# Patient Record
Sex: MALE | Race: BLACK OR AFRICAN AMERICAN | NOT HISPANIC OR LATINO
[De-identification: names, ages, dates, MRNs, and addresses within clinical notes are randomized per-mention and may not be internally consistent; named-entity substitution may affect disease eponyms.]

---

## 2017-06-04 ENCOUNTER — RX RENEWAL (OUTPATIENT)
Age: 47
End: 2017-06-04

## 2017-06-20 ENCOUNTER — APPOINTMENT (OUTPATIENT)
Dept: UROLOGY | Facility: CLINIC | Age: 47
End: 2017-06-20

## 2017-07-01 ENCOUNTER — EMERGENCY (EMERGENCY)
Facility: HOSPITAL | Age: 47
LOS: 0 days | Discharge: HOME | End: 2017-07-01
Attending: EMERGENCY MEDICINE
Payer: MEDICAID

## 2017-07-01 VITALS
HEART RATE: 76 BPM | HEIGHT: 77 IN | SYSTOLIC BLOOD PRESSURE: 128 MMHG | OXYGEN SATURATION: 98 % | WEIGHT: 240.08 LBS | TEMPERATURE: 98 F | DIASTOLIC BLOOD PRESSURE: 78 MMHG | RESPIRATION RATE: 18 BRPM

## 2017-07-01 DIAGNOSIS — M25.569 PAIN IN UNSPECIFIED KNEE: ICD-10-CM

## 2017-07-01 DIAGNOSIS — Z91.010 ALLERGY TO PEANUTS: ICD-10-CM

## 2017-07-01 DIAGNOSIS — I10 ESSENTIAL (PRIMARY) HYPERTENSION: ICD-10-CM

## 2017-07-01 DIAGNOSIS — M17.0 BILATERAL PRIMARY OSTEOARTHRITIS OF KNEE: ICD-10-CM

## 2017-07-01 PROCEDURE — 73562 X-RAY EXAM OF KNEE 3: CPT | Mod: 26,50

## 2017-07-01 PROCEDURE — 99283 EMERGENCY DEPT VISIT LOW MDM: CPT

## 2017-07-01 RX ORDER — BACLOFEN 100 %
1 POWDER (GRAM) MISCELLANEOUS
Qty: 20 | Refills: 0 | OUTPATIENT
Start: 2017-07-01 | End: 2017-07-11

## 2017-07-01 NOTE — ED ADULT NURSE NOTE - OBJECTIVE STATEMENT
Patient stated that he has been having bilateral knees pain for about awhile, right knee pain worse than the left, pain of 8 on a scale of 0 to 10.  Swelling to right knee noted, patient has history of knee pain, denies injury, denies fall, denies recent trauma

## 2017-08-03 ENCOUNTER — APPOINTMENT (OUTPATIENT)
Dept: UROLOGY | Facility: CLINIC | Age: 47
End: 2017-08-03
Payer: MEDICAID

## 2017-08-03 VITALS — BODY MASS INDEX: 29.41 KG/M2 | WEIGHT: 248 LBS

## 2017-08-03 PROCEDURE — 99214 OFFICE O/P EST MOD 30 MIN: CPT

## 2017-09-09 NOTE — ED ADULT NURSE NOTE - MODE OF DISCHARGE
No bruits; no thyromegaly or nodules No bruits; no thyromegaly or nodules No bruits; no thyromegaly or nodules No bruits; no thyromegaly or nodules No bruits; no thyromegaly or nodules No bruits; no thyromegaly or nodules No bruits; no thyromegaly or nodules No bruits; no thyromegaly or nodules Ambulatory

## 2017-09-12 ENCOUNTER — APPOINTMENT (OUTPATIENT)
Dept: UROLOGY | Facility: CLINIC | Age: 47
End: 2017-09-12

## 2017-10-16 ENCOUNTER — EMERGENCY (EMERGENCY)
Facility: HOSPITAL | Age: 47
LOS: 0 days | Discharge: HOME | End: 2017-10-16
Attending: EMERGENCY MEDICINE
Payer: MEDICAID

## 2017-10-16 VITALS
OXYGEN SATURATION: 96 % | SYSTOLIC BLOOD PRESSURE: 156 MMHG | HEIGHT: 77 IN | WEIGHT: 244.05 LBS | RESPIRATION RATE: 20 BRPM | TEMPERATURE: 99 F | DIASTOLIC BLOOD PRESSURE: 103 MMHG | HEART RATE: 63 BPM

## 2017-10-16 DIAGNOSIS — I10 ESSENTIAL (PRIMARY) HYPERTENSION: ICD-10-CM

## 2017-10-16 DIAGNOSIS — Z79.82 LONG TERM (CURRENT) USE OF ASPIRIN: ICD-10-CM

## 2017-10-16 DIAGNOSIS — Z91.010 ALLERGY TO PEANUTS: ICD-10-CM

## 2017-10-16 DIAGNOSIS — R05 COUGH: ICD-10-CM

## 2017-10-16 PROCEDURE — 99284 EMERGENCY DEPT VISIT MOD MDM: CPT | Mod: 25

## 2017-10-16 PROCEDURE — 71020: CPT | Mod: 26

## 2017-10-16 NOTE — ED ADULT NURSE NOTE - OBJECTIVE STATEMENT
sleep apnea, htn, dm 2, htn, allergic rhinitis, gerd, dm neuropathy  dx'd w/ acute bronchitis last week, took Azithromycin and completed course reports productive cough, white phlegm and night sweats x 3 wks

## 2017-10-16 NOTE — ED PROVIDER NOTE - MEDICAL DECISION MAKING DETAILS
cough, otherwise clear lungs to dc with hycodan for cough otherwise already was on azithromycin to dc with follow up with PMD.

## 2017-10-16 NOTE — ED PROVIDER NOTE - OBJECTIVE STATEMENT
47 year old male with PMH HTN presenting due to cough x 3 weeks, had been on azithromycin last week otherwise states no fever/chills but had been having some sweating episodes. No SOB no wheezing at this time, cough is dry and otherwise cough medications not working. on benzonatate

## 2017-12-18 ENCOUNTER — EMERGENCY (EMERGENCY)
Facility: HOSPITAL | Age: 47
LOS: 0 days | Discharge: HOME | End: 2017-12-18
Attending: EMERGENCY MEDICINE
Payer: MEDICAID

## 2017-12-18 VITALS
HEART RATE: 74 BPM | RESPIRATION RATE: 17 BRPM | SYSTOLIC BLOOD PRESSURE: 145 MMHG | WEIGHT: 271.17 LBS | HEIGHT: 77 IN | TEMPERATURE: 98 F | DIASTOLIC BLOOD PRESSURE: 88 MMHG | OXYGEN SATURATION: 98 %

## 2017-12-18 PROCEDURE — 73562 X-RAY EXAM OF KNEE 3: CPT | Mod: 26,RT

## 2017-12-18 PROCEDURE — 99284 EMERGENCY DEPT VISIT MOD MDM: CPT

## 2017-12-18 RX ORDER — KETOROLAC TROMETHAMINE 30 MG/ML
30 SYRINGE (ML) INJECTION ONCE
Qty: 0 | Refills: 0 | Status: DISCONTINUED | OUTPATIENT
Start: 2017-12-18 | End: 2017-12-18

## 2017-12-18 RX ADMIN — Medication 30 MILLIGRAM(S): at 11:52

## 2017-12-18 NOTE — ED PROVIDER NOTE - MUSCULOSKELETAL, MLM
Spine appears normal, range of motion is not limited, no muscle or joint tenderness Spine appears normal, range of motion is not limited, left medial knee mild tenderness, + mild knee swelling

## 2017-12-18 NOTE — ED ADULT NURSE NOTE - OBJECTIVE STATEMENT
received pt to ft alert and oriented times 4. complaining of right knee pain. states " I think I pulled something while I was cleaning." pt states he has history of knee effusion and arhtritis in that knee
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
## 2017-12-19 DIAGNOSIS — I10 ESSENTIAL (PRIMARY) HYPERTENSION: ICD-10-CM

## 2017-12-19 DIAGNOSIS — M25.561 PAIN IN RIGHT KNEE: ICD-10-CM

## 2017-12-19 DIAGNOSIS — S83.411A SPRAIN OF MEDIAL COLLATERAL LIGAMENT OF RIGHT KNEE, INITIAL ENCOUNTER: ICD-10-CM

## 2017-12-19 DIAGNOSIS — M13.861 OTHER SPECIFIED ARTHRITIS, RIGHT KNEE: ICD-10-CM

## 2017-12-19 DIAGNOSIS — Z91.010 ALLERGY TO PEANUTS: ICD-10-CM

## 2018-01-30 ENCOUNTER — EMERGENCY (EMERGENCY)
Facility: HOSPITAL | Age: 48
LOS: 0 days | Discharge: HOME | End: 2018-01-30
Attending: EMERGENCY MEDICINE
Payer: MEDICAID

## 2018-01-30 VITALS
DIASTOLIC BLOOD PRESSURE: 119 MMHG | SYSTOLIC BLOOD PRESSURE: 172 MMHG | HEART RATE: 81 BPM | WEIGHT: 270.07 LBS | RESPIRATION RATE: 17 BRPM | HEIGHT: 76 IN | OXYGEN SATURATION: 100 % | TEMPERATURE: 98 F

## 2018-01-30 VITALS
RESPIRATION RATE: 18 BRPM | DIASTOLIC BLOOD PRESSURE: 102 MMHG | SYSTOLIC BLOOD PRESSURE: 145 MMHG | HEART RATE: 75 BPM | OXYGEN SATURATION: 100 %

## 2018-01-30 PROCEDURE — 99284 EMERGENCY DEPT VISIT MOD MDM: CPT

## 2018-01-30 PROCEDURE — 72100 X-RAY EXAM L-S SPINE 2/3 VWS: CPT | Mod: 26

## 2018-01-30 RX ORDER — KETOROLAC TROMETHAMINE 30 MG/ML
30 SYRINGE (ML) INJECTION ONCE
Qty: 0 | Refills: 0 | Status: DISCONTINUED | OUTPATIENT
Start: 2018-01-30 | End: 2018-01-30

## 2018-01-30 RX ADMIN — Medication 30 MILLIGRAM(S): at 21:55

## 2018-01-30 RX ADMIN — Medication 30 MILLIGRAM(S): at 22:06

## 2018-01-30 NOTE — ED PROVIDER NOTE - MUSCULOSKELETAL, MLM
Spine appears normal, range of motion is not limited, + mild bilateral mid to distal paravertebral lumbar tenderness
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
## 2018-01-31 DIAGNOSIS — M54.5 LOW BACK PAIN: ICD-10-CM

## 2018-01-31 DIAGNOSIS — M17.9 OSTEOARTHRITIS OF KNEE, UNSPECIFIED: ICD-10-CM

## 2018-01-31 DIAGNOSIS — Z79.82 LONG TERM (CURRENT) USE OF ASPIRIN: ICD-10-CM
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
## 2018-01-31 DIAGNOSIS — S39.012A STRAIN OF MUSCLE, FASCIA AND TENDON OF LOWER BACK, INITIAL ENCOUNTER: ICD-10-CM

## 2018-01-31 DIAGNOSIS — I10 ESSENTIAL (PRIMARY) HYPERTENSION: ICD-10-CM
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
## 2018-01-31 DIAGNOSIS — Z91.010 ALLERGY TO PEANUTS: ICD-10-CM

## 2018-01-31 DIAGNOSIS — M16.11 UNILATERAL PRIMARY OSTEOARTHRITIS, RIGHT HIP: ICD-10-CM

## 2018-03-18 ENCOUNTER — EMERGENCY (EMERGENCY)
Facility: HOSPITAL | Age: 48
LOS: 0 days | Discharge: HOME | End: 2018-03-18
Attending: EMERGENCY MEDICINE
Payer: MEDICARE

## 2018-03-18 VITALS
DIASTOLIC BLOOD PRESSURE: 69 MMHG | OXYGEN SATURATION: 99 % | TEMPERATURE: 98 F | RESPIRATION RATE: 19 BRPM | SYSTOLIC BLOOD PRESSURE: 100 MMHG | HEART RATE: 60 BPM

## 2018-03-18 VITALS
DIASTOLIC BLOOD PRESSURE: 65 MMHG | HEIGHT: 77 IN | TEMPERATURE: 98 F | WEIGHT: 259.93 LBS | OXYGEN SATURATION: 97 % | HEART RATE: 74 BPM | SYSTOLIC BLOOD PRESSURE: 113 MMHG | RESPIRATION RATE: 16 BRPM

## 2018-03-18 DIAGNOSIS — K59.00 CONSTIPATION, UNSPECIFIED: ICD-10-CM

## 2018-03-18 DIAGNOSIS — I10 ESSENTIAL (PRIMARY) HYPERTENSION: ICD-10-CM

## 2018-03-18 DIAGNOSIS — M13.861 OTHER SPECIFIED ARTHRITIS, RIGHT KNEE: ICD-10-CM

## 2018-03-18 DIAGNOSIS — E11.9 TYPE 2 DIABETES MELLITUS WITHOUT COMPLICATIONS: ICD-10-CM

## 2018-03-18 DIAGNOSIS — R10.9 UNSPECIFIED ABDOMINAL PAIN: ICD-10-CM

## 2018-03-18 LAB
ALBUMIN SERPL ELPH-MCNC: 3.7 G/DL — SIGNIFICANT CHANGE UP (ref 3.3–5)
ALP SERPL-CCNC: 62 U/L — SIGNIFICANT CHANGE UP (ref 40–120)
ALT FLD-CCNC: 19 U/L — SIGNIFICANT CHANGE UP (ref 12–78)
ANION GAP SERPL CALC-SCNC: 8 MMOL/L — SIGNIFICANT CHANGE UP (ref 5–17)
APPEARANCE UR: CLEAR — SIGNIFICANT CHANGE UP
AST SERPL-CCNC: 17 U/L — SIGNIFICANT CHANGE UP (ref 15–37)
BASOPHILS # BLD AUTO: 0.04 K/UL — SIGNIFICANT CHANGE UP (ref 0–0.2)
BASOPHILS NFR BLD AUTO: 0.9 % — SIGNIFICANT CHANGE UP (ref 0–2)
BILIRUB SERPL-MCNC: 0.7 MG/DL — SIGNIFICANT CHANGE UP (ref 0.2–1.2)
BILIRUB UR-MCNC: NEGATIVE — SIGNIFICANT CHANGE UP
BUN SERPL-MCNC: 12 MG/DL — SIGNIFICANT CHANGE UP (ref 7–23)
CALCIUM SERPL-MCNC: 9 MG/DL — SIGNIFICANT CHANGE UP (ref 8.5–10.1)
CHLORIDE SERPL-SCNC: 103 MMOL/L — SIGNIFICANT CHANGE UP (ref 96–108)
CO2 SERPL-SCNC: 27 MMOL/L — SIGNIFICANT CHANGE UP (ref 22–31)
COLOR SPEC: YELLOW — SIGNIFICANT CHANGE UP
CREAT SERPL-MCNC: 1.75 MG/DL — HIGH (ref 0.5–1.3)
DIFF PNL FLD: NEGATIVE — SIGNIFICANT CHANGE UP
EOSINOPHIL # BLD AUTO: 0.11 K/UL — SIGNIFICANT CHANGE UP (ref 0–0.5)
EOSINOPHIL NFR BLD AUTO: 2.5 % — SIGNIFICANT CHANGE UP (ref 0–6)
GLUCOSE SERPL-MCNC: 127 MG/DL — HIGH (ref 70–99)
GLUCOSE UR QL: NEGATIVE MG/DL — SIGNIFICANT CHANGE UP
HCT VFR BLD CALC: 40.4 % — SIGNIFICANT CHANGE UP (ref 39–50)
HGB BLD-MCNC: 13.3 G/DL — SIGNIFICANT CHANGE UP (ref 13–17)
IMM GRANULOCYTES NFR BLD AUTO: 0.2 % — SIGNIFICANT CHANGE UP (ref 0–1.5)
KETONES UR-MCNC: NEGATIVE — SIGNIFICANT CHANGE UP
LEUKOCYTE ESTERASE UR-ACNC: NEGATIVE — SIGNIFICANT CHANGE UP
LIDOCAIN IGE QN: 152 U/L — SIGNIFICANT CHANGE UP (ref 73–393)
LYMPHOCYTES # BLD AUTO: 1.6 K/UL — SIGNIFICANT CHANGE UP (ref 1–3.3)
LYMPHOCYTES # BLD AUTO: 36.4 % — SIGNIFICANT CHANGE UP (ref 13–44)
MCHC RBC-ENTMCNC: 27.1 PG — SIGNIFICANT CHANGE UP (ref 27–34)
MCHC RBC-ENTMCNC: 32.9 GM/DL — SIGNIFICANT CHANGE UP (ref 32–36)
MCV RBC AUTO: 82.3 FL — SIGNIFICANT CHANGE UP (ref 80–100)
MONOCYTES # BLD AUTO: 0.4 K/UL — SIGNIFICANT CHANGE UP (ref 0–0.9)
MONOCYTES NFR BLD AUTO: 9.1 % — SIGNIFICANT CHANGE UP (ref 2–14)
NEUTROPHILS # BLD AUTO: 2.23 K/UL — SIGNIFICANT CHANGE UP (ref 1.8–7.4)
NEUTROPHILS NFR BLD AUTO: 50.9 % — SIGNIFICANT CHANGE UP (ref 43–77)
NITRITE UR-MCNC: NEGATIVE — SIGNIFICANT CHANGE UP
NRBC # BLD: 0 /100 WBCS — SIGNIFICANT CHANGE UP (ref 0–0)
PH UR: 7 — SIGNIFICANT CHANGE UP (ref 5–8)
PLATELET # BLD AUTO: 187 K/UL — SIGNIFICANT CHANGE UP (ref 150–400)
POTASSIUM SERPL-MCNC: 3.6 MMOL/L — SIGNIFICANT CHANGE UP (ref 3.5–5.3)
POTASSIUM SERPL-SCNC: 3.6 MMOL/L — SIGNIFICANT CHANGE UP (ref 3.5–5.3)
PROT SERPL-MCNC: 7.7 GM/DL — SIGNIFICANT CHANGE UP (ref 6–8.3)
PROT UR-MCNC: NEGATIVE MG/DL — SIGNIFICANT CHANGE UP
RBC # BLD: 4.91 M/UL — SIGNIFICANT CHANGE UP (ref 4.2–5.8)
RBC # FLD: 14.2 % — SIGNIFICANT CHANGE UP (ref 10.3–14.5)
SODIUM SERPL-SCNC: 138 MMOL/L — SIGNIFICANT CHANGE UP (ref 135–145)
SP GR SPEC: 1 — LOW (ref 1.01–1.02)
UROBILINOGEN FLD QL: NEGATIVE MG/DL — SIGNIFICANT CHANGE UP
WBC # BLD: 4.39 K/UL — SIGNIFICANT CHANGE UP (ref 3.8–10.5)
WBC # FLD AUTO: 4.39 K/UL — SIGNIFICANT CHANGE UP (ref 3.8–10.5)

## 2018-03-18 PROCEDURE — 99284 EMERGENCY DEPT VISIT MOD MDM: CPT

## 2018-03-18 PROCEDURE — 74018 RADEX ABDOMEN 1 VIEW: CPT | Mod: 26

## 2018-03-18 RX ORDER — SODIUM CHLORIDE 9 MG/ML
3 INJECTION INTRAMUSCULAR; INTRAVENOUS; SUBCUTANEOUS ONCE
Qty: 0 | Refills: 0 | Status: COMPLETED | OUTPATIENT
Start: 2018-03-18 | End: 2018-03-18

## 2018-03-18 RX ORDER — DULOXETINE HYDROCHLORIDE 30 MG/1
0 CAPSULE, DELAYED RELEASE ORAL
Qty: 0 | Refills: 0 | COMMUNITY

## 2018-03-18 RX ORDER — GABAPENTIN 400 MG/1
1 CAPSULE ORAL
Qty: 0 | Refills: 0 | COMMUNITY

## 2018-03-18 RX ORDER — METFORMIN HYDROCHLORIDE 850 MG/1
0 TABLET ORAL
Qty: 0 | Refills: 0 | COMMUNITY

## 2018-03-18 RX ORDER — POLYETHYLENE GLYCOL 3350 17 G/17G
17 POWDER, FOR SOLUTION ORAL
Qty: 120 | Refills: 0 | OUTPATIENT
Start: 2018-03-18 | End: 2018-03-24

## 2018-03-18 RX ORDER — SODIUM CHLORIDE 9 MG/ML
1000 INJECTION INTRAMUSCULAR; INTRAVENOUS; SUBCUTANEOUS ONCE
Qty: 0 | Refills: 0 | Status: COMPLETED | OUTPATIENT
Start: 2018-03-18 | End: 2018-03-18

## 2018-03-18 RX ORDER — OXYCODONE HYDROCHLORIDE 5 MG/1
1 TABLET ORAL
Qty: 0 | Refills: 0 | COMMUNITY

## 2018-03-18 RX ORDER — LIDOCAINE 4 G/100G
10 CREAM TOPICAL ONCE
Qty: 0 | Refills: 0 | Status: DISCONTINUED | OUTPATIENT
Start: 2018-03-18 | End: 2018-03-18

## 2018-03-18 RX ADMIN — SODIUM CHLORIDE 2000 MILLILITER(S): 9 INJECTION INTRAMUSCULAR; INTRAVENOUS; SUBCUTANEOUS at 12:20

## 2018-03-18 RX ADMIN — SODIUM CHLORIDE 1000 MILLILITER(S): 9 INJECTION INTRAMUSCULAR; INTRAVENOUS; SUBCUTANEOUS at 11:06

## 2018-03-18 RX ADMIN — Medication 1 ENEMA: at 11:11

## 2018-03-18 RX ADMIN — SODIUM CHLORIDE 3 MILLILITER(S): 9 INJECTION INTRAMUSCULAR; INTRAVENOUS; SUBCUTANEOUS at 11:05

## 2018-03-18 NOTE — ED PROVIDER NOTE - MEDICAL DECISION MAKING DETAILS
Pt well appearing, enema given with mult hard small BM. dc with miralax. pt also aware of inc Cr likely from Pt well appearing, enema given with mult hard small BM and imporvement. dc with miralax. pt also aware of inc Cr, told he had large prostate, pt with post renal renal insufficiency, bedside pvr is ~ 150. PT still urinating, will need fu with urology. Discussed results and outcome of testing with the patient.  Patient given copy of available results. Patient advised to please follow up with their PMD within the next 24 hours and return to the Emergency Department for worsening symptoms or any other concerns.

## 2018-03-18 NOTE — ED ADULT TRIAGE NOTE - CHIEF COMPLAINT QUOTE
abdominal pain with nausea and constipation x 4 days, took an enema on Wednesday and moved just a little bit, makes him weak and dizzy instead. H/O gastritis and just started new diet

## 2018-03-18 NOTE — ED ADULT NURSE REASSESSMENT NOTE - NS ED NURSE REASSESS COMMENT FT1
patient A&Ox3 in no acute distress no pain at this time , patient had a BM l, patient discharge s orders heplock remove , left ER self ambulated with wife on his side

## 2018-03-18 NOTE — ED ADULT NURSE NOTE - OBJECTIVE STATEMENT
patient c/o of abdominal pian , started 1 week ago , patient c/o of constipation , patient denied blood in urine , no blood in stool , last BM 3 days ago

## 2018-03-19 LAB
CULTURE RESULTS: NO GROWTH — SIGNIFICANT CHANGE UP
SPECIMEN SOURCE: SIGNIFICANT CHANGE UP

## 2018-03-22 ENCOUNTER — APPOINTMENT (OUTPATIENT)
Dept: UROLOGY | Facility: CLINIC | Age: 48
End: 2018-03-22
Payer: MEDICAID

## 2018-03-22 VITALS
HEIGHT: 77 IN | SYSTOLIC BLOOD PRESSURE: 103 MMHG | TEMPERATURE: 97.9 F | DIASTOLIC BLOOD PRESSURE: 69 MMHG | HEART RATE: 73 BPM | RESPIRATION RATE: 16 BRPM

## 2018-03-22 PROCEDURE — 99214 OFFICE O/P EST MOD 30 MIN: CPT

## 2018-03-23 ENCOUNTER — APPOINTMENT (OUTPATIENT)
Dept: UROLOGY | Facility: CLINIC | Age: 48
End: 2018-03-23
Payer: MEDICAID

## 2018-03-23 DIAGNOSIS — N40.1 BENIGN PROSTATIC HYPERPLASIA WITH LOWER URINARY TRACT SYMPMS: ICD-10-CM

## 2018-03-23 DIAGNOSIS — N13.8 BENIGN PROSTATIC HYPERPLASIA WITH LOWER URINARY TRACT SYMPMS: ICD-10-CM

## 2018-03-23 PROCEDURE — 99214 OFFICE O/P EST MOD 30 MIN: CPT

## 2018-03-23 RX ORDER — TAMSULOSIN HYDROCHLORIDE 0.4 MG/1
0.4 CAPSULE ORAL
Qty: 30 | Refills: 0 | Status: ACTIVE | COMMUNITY
Start: 2018-03-23 | End: 1900-01-01

## 2018-03-25 ENCOUNTER — FORM ENCOUNTER (OUTPATIENT)
Age: 48
End: 2018-03-25

## 2018-03-26 ENCOUNTER — OUTPATIENT (OUTPATIENT)
Dept: OUTPATIENT SERVICES | Facility: HOSPITAL | Age: 48
LOS: 1 days | End: 2018-03-26
Payer: MEDICARE

## 2018-03-26 ENCOUNTER — APPOINTMENT (OUTPATIENT)
Dept: ULTRASOUND IMAGING | Facility: CLINIC | Age: 48
End: 2018-03-26
Payer: MEDICARE

## 2018-03-26 DIAGNOSIS — N40.1 BENIGN PROSTATIC HYPERPLASIA WITH LOWER URINARY TRACT SYMPTOMS: ICD-10-CM

## 2018-03-26 LAB — PSA SERPL-MCNC: 1.04 NG/ML

## 2018-03-26 PROCEDURE — 76770 US EXAM ABDO BACK WALL COMP: CPT

## 2018-03-26 PROCEDURE — 76770 US EXAM ABDO BACK WALL COMP: CPT | Mod: 26

## 2018-03-26 NOTE — ED ADULT NURSE NOTE - CARDIO WDL
Normal rate, regular rhythm, normal S1, S2 heart sounds heard.
Breath sounds clear and equal bilaterally.

## 2018-06-06 ENCOUNTER — EMERGENCY (EMERGENCY)
Facility: HOSPITAL | Age: 48
LOS: 0 days | Discharge: HOME | End: 2018-06-06
Attending: EMERGENCY MEDICINE
Payer: MEDICARE

## 2018-06-06 VITALS
HEART RATE: 79 BPM | WEIGHT: 250 LBS | DIASTOLIC BLOOD PRESSURE: 87 MMHG | TEMPERATURE: 98 F | HEIGHT: 77 IN | SYSTOLIC BLOOD PRESSURE: 115 MMHG | OXYGEN SATURATION: 100 % | RESPIRATION RATE: 17 BRPM

## 2018-06-06 PROCEDURE — 73130 X-RAY EXAM OF HAND: CPT | Mod: 26,LT

## 2018-06-06 PROCEDURE — 99283 EMERGENCY DEPT VISIT LOW MDM: CPT

## 2018-06-06 RX ORDER — ACETAMINOPHEN 500 MG
650 TABLET ORAL ONCE
Qty: 0 | Refills: 0 | Status: COMPLETED | OUTPATIENT
Start: 2018-06-06 | End: 2018-06-06

## 2018-06-06 RX ORDER — TETANUS TOXOID, REDUCED DIPHTHERIA TOXOID AND ACELLULAR PERTUSSIS VACCINE, ADSORBED 5; 2.5; 8; 8; 2.5 [IU]/.5ML; [IU]/.5ML; UG/.5ML; UG/.5ML; UG/.5ML
0.5 SUSPENSION INTRAMUSCULAR ONCE
Qty: 0 | Refills: 0 | Status: COMPLETED | OUTPATIENT
Start: 2018-06-06 | End: 2018-06-06

## 2018-06-06 RX ORDER — ARIPIPRAZOLE 15 MG/1
0 TABLET ORAL
Qty: 0 | Refills: 0 | COMMUNITY

## 2018-06-06 RX ORDER — CARVEDILOL PHOSPHATE 80 MG/1
1 CAPSULE, EXTENDED RELEASE ORAL
Qty: 0 | Refills: 0 | COMMUNITY

## 2018-06-06 RX ORDER — ATORVASTATIN CALCIUM 80 MG/1
1 TABLET, FILM COATED ORAL
Qty: 0 | Refills: 0 | COMMUNITY

## 2018-06-06 RX ADMIN — Medication 650 MILLIGRAM(S): at 19:22

## 2018-06-06 RX ADMIN — TETANUS TOXOID, REDUCED DIPHTHERIA TOXOID AND ACELLULAR PERTUSSIS VACCINE, ADSORBED 0.5 MILLILITER(S): 5; 2.5; 8; 8; 2.5 SUSPENSION INTRAMUSCULAR at 19:22

## 2018-06-06 NOTE — ED PROVIDER NOTE - ATTENDING CONTRIBUTION TO CARE
Patient evaluated and seen with RICHARD Glaser agree with above history and physical - pt examined and seen by me personally - findings as seen: Pt with left 3rd digit nail puncture wound - otherwise grazed surface of nail - xray noted no foreign body - given TDAP and abx for prophylaxis and follow up with PMD.

## 2018-06-06 NOTE — ED PROVIDER NOTE - MEDICAL DECISION MAKING DETAILS
tetanus updated, xray without acute pathology, pain control, will give levaquin to cover for pseudomonas, nail cleaned, no subungal hematoma, educated re f/u needs, ok with dc

## 2018-06-06 NOTE — ED PROVIDER NOTE - OBJECTIVE STATEMENT
48 y/o male with PMH HTN here c/o L middle fingernail injury x 1.5 hours ago. pt states as he was fixing a  his house, a screw went into his fingernail. mild bleeding which subsided now. reports some pain to site as well. unsure of last tetanus. 46 y/o male with PMH HTN here c/o L middle fingernail injury x 1.5 hours ago. pt states as he was fixing a  his house, a screw went into his fingernail. mild bleeding which subsided now. reports some pain to site as well. unsure of last tetanus. pt is L hand dominant. no change in sensation. no fevers or chills. pt has no other complaints.    ROS: No fever/chills. No eye pain/changes in vision, No ear pain/sore throat/dysphagia, No chest pain/palpitations. No SOB/cough/. No abdominal pain, N/V/D, no black/bloody bm. No dysuria/frequency/discharge, No headache. No Dizziness.    No rashes +injury to  L middle finger. No numbness/tingling/weakness.

## 2018-06-06 NOTE — ED PROVIDER NOTE - PHYSICAL EXAMINATION
Gen: Alert, NAD, well appearing  Head: NC, AT, PERRL, EOMI, normal lids/conjunctiva  ENT: B TM WNL, normal hearing, patent oropharynx without erythema/exudate, uvula midline  Neck: +supple, no tenderness/meningismus/JVD, +Trachea midline  Pulm: Bilateral BS, normal resp effort, no wheeze/stridor/retractions  CV: RRR, no M/R/G, +dist pulses  Abd: soft, NT/ND, +BS, no hepatosplenomegaly  Mskel: no edema/erythema/cyanosis, L hand full ROM, sensations intact u/r/m, str 5/5, good ,    Skin: no rash, LO middle finger nail with 0.5cm abrasion, no active bleeding, no erythema, no ttp, no discharge, nail intact  Neuro: AAOx3, no sensory/motor deficits, CN 2-12 intact
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
## 2018-06-07 DIAGNOSIS — Z79.82 LONG TERM (CURRENT) USE OF ASPIRIN: ICD-10-CM

## 2018-06-07 DIAGNOSIS — S63.613A UNSPECIFIED SPRAIN OF LEFT MIDDLE FINGER, INITIAL ENCOUNTER: ICD-10-CM
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
## 2018-06-07 DIAGNOSIS — Z91.010 ALLERGY TO PEANUTS: ICD-10-CM

## 2018-06-07 DIAGNOSIS — Z79.84 LONG TERM (CURRENT) USE OF ORAL HYPOGLYCEMIC DRUGS: ICD-10-CM

## 2018-06-26 ENCOUNTER — APPOINTMENT (OUTPATIENT)
Dept: UROLOGY | Facility: CLINIC | Age: 48
End: 2018-06-26
Payer: MEDICAID

## 2018-06-26 VITALS
WEIGHT: 252 LBS | TEMPERATURE: 97.7 F | RESPIRATION RATE: 15 BRPM | HEART RATE: 59 BPM | DIASTOLIC BLOOD PRESSURE: 96 MMHG | BODY MASS INDEX: 29.76 KG/M2 | HEIGHT: 77 IN | SYSTOLIC BLOOD PRESSURE: 156 MMHG

## 2018-06-26 PROCEDURE — 99214 OFFICE O/P EST MOD 30 MIN: CPT

## 2018-06-27 LAB
ALBUMIN SERPL ELPH-MCNC: 4.2 G/DL
ALP BLD-CCNC: 58 U/L
ALT SERPL-CCNC: 14 U/L
ANION GAP SERPL CALC-SCNC: 16 MMOL/L
AST SERPL-CCNC: 18 U/L
BASOPHILS # BLD AUTO: 0.03 K/UL
BASOPHILS NFR BLD AUTO: 0.7 %
BILIRUB SERPL-MCNC: 0.7 MG/DL
BUN SERPL-MCNC: 9 MG/DL
CALCIUM SERPL-MCNC: 9.4 MG/DL
CHLORIDE SERPL-SCNC: 102 MMOL/L
CO2 SERPL-SCNC: 27 MMOL/L
CREAT SERPL-MCNC: 0.99 MG/DL
EOSINOPHIL # BLD AUTO: 0.1 K/UL
EOSINOPHIL NFR BLD AUTO: 2.2 %
ESTRADIOL SERPL-MCNC: 15 PG/ML
GLUCOSE SERPL-MCNC: 108 MG/DL
HBA1C MFR BLD HPLC: 6.2 %
HCT VFR BLD CALC: 38.5 %
HGB BLD-MCNC: 12.6 G/DL
IMM GRANULOCYTES NFR BLD AUTO: 0 %
LYMPHOCYTES # BLD AUTO: 1.87 K/UL
LYMPHOCYTES NFR BLD AUTO: 41 %
MAN DIFF?: NORMAL
MCHC RBC-ENTMCNC: 26.8 PG
MCHC RBC-ENTMCNC: 32.7 GM/DL
MCV RBC AUTO: 81.9 FL
MONOCYTES # BLD AUTO: 0.36 K/UL
MONOCYTES NFR BLD AUTO: 7.9 %
NEUTROPHILS # BLD AUTO: 2.2 K/UL
NEUTROPHILS NFR BLD AUTO: 48.2 %
PLATELET # BLD AUTO: 192 K/UL
POTASSIUM SERPL-SCNC: 4 MMOL/L
PROT SERPL-MCNC: 7.5 G/DL
RBC # BLD: 4.7 M/UL
RBC # FLD: 16.3 %
SODIUM SERPL-SCNC: 145 MMOL/L
WBC # FLD AUTO: 4.56 K/UL

## 2018-07-02 RX ORDER — SILDENAFIL 20 MG/1
20 TABLET ORAL
Qty: 30 | Refills: 3 | Status: ACTIVE | COMMUNITY
Start: 2017-08-03

## 2018-07-03 NOTE — ED PROVIDER NOTE - OBJECTIVE STATEMENT
46yo male with pmh DM, HTN, GERD, arthritis on oxycodone (last taken 2 weeks ago), psh: none, presents with abd bloating, nausea and constipation x 4 days. Pt also reports this started when he recently changed diet. + flatus. No vomiting. Pt trialled enema 3 days ago and only "santi" came out. He did strain at that time and felt weak but that resolved.     ROS: No fever/chills. No photophobia/eye pain/changes in vision, No ear pain/sore throat/dysphagia, No chest pain/palpitations. No SOB/cough/stridor. + abdominal pain, no V/D, no black/bloody bm. No dysuria/frequency/discharge, No headache. No Dizziness.  No rash.  No numbness/tingling/weakness. Hpi Title: Evaluation of Skin Lesions How Severe Are Your Spot(S)?: mild Have Your Spot(S) Been Treated In The Past?: has not been treated

## 2018-10-11 ENCOUNTER — APPOINTMENT (OUTPATIENT)
Dept: UROLOGY | Facility: CLINIC | Age: 48
End: 2018-10-11

## 2019-01-08 ENCOUNTER — EMERGENCY (EMERGENCY)
Facility: HOSPITAL | Age: 49
LOS: 0 days | Discharge: HOME | End: 2019-01-08
Attending: EMERGENCY MEDICINE
Payer: MEDICARE

## 2019-01-08 VITALS
TEMPERATURE: 99 F | OXYGEN SATURATION: 97 % | SYSTOLIC BLOOD PRESSURE: 184 MMHG | DIASTOLIC BLOOD PRESSURE: 114 MMHG | HEART RATE: 62 BPM | WEIGHT: 270.07 LBS | RESPIRATION RATE: 20 BRPM | HEIGHT: 77 IN

## 2019-01-08 VITALS
RESPIRATION RATE: 22 BRPM | DIASTOLIC BLOOD PRESSURE: 96 MMHG | HEART RATE: 68 BPM | OXYGEN SATURATION: 97 % | SYSTOLIC BLOOD PRESSURE: 152 MMHG | TEMPERATURE: 97 F

## 2019-01-08 DIAGNOSIS — R53.1 WEAKNESS: ICD-10-CM

## 2019-01-08 DIAGNOSIS — B34.9 VIRAL INFECTION, UNSPECIFIED: ICD-10-CM

## 2019-01-08 DIAGNOSIS — R50.9 FEVER, UNSPECIFIED: ICD-10-CM

## 2019-01-08 DIAGNOSIS — R05 COUGH: ICD-10-CM

## 2019-01-08 DIAGNOSIS — Z79.4 LONG TERM (CURRENT) USE OF INSULIN: ICD-10-CM

## 2019-01-08 DIAGNOSIS — E11.9 TYPE 2 DIABETES MELLITUS WITHOUT COMPLICATIONS: ICD-10-CM

## 2019-01-08 DIAGNOSIS — I10 ESSENTIAL (PRIMARY) HYPERTENSION: ICD-10-CM

## 2019-01-08 DIAGNOSIS — Z79.82 LONG TERM (CURRENT) USE OF ASPIRIN: ICD-10-CM

## 2019-01-08 DIAGNOSIS — M19.90 UNSPECIFIED OSTEOARTHRITIS, UNSPECIFIED SITE: ICD-10-CM

## 2019-01-08 LAB
ALBUMIN SERPL ELPH-MCNC: 3.7 G/DL — SIGNIFICANT CHANGE UP (ref 3.3–5)
ALP SERPL-CCNC: 76 U/L — SIGNIFICANT CHANGE UP (ref 40–120)
ALT FLD-CCNC: 27 U/L — SIGNIFICANT CHANGE UP (ref 12–78)
ANION GAP SERPL CALC-SCNC: 5 MMOL/L — SIGNIFICANT CHANGE UP (ref 5–17)
APTT BLD: 33 SEC — SIGNIFICANT CHANGE UP (ref 28.5–37)
AST SERPL-CCNC: 18 U/L — SIGNIFICANT CHANGE UP (ref 15–37)
BILIRUB SERPL-MCNC: 0.4 MG/DL — SIGNIFICANT CHANGE UP (ref 0.2–1.2)
BUN SERPL-MCNC: 13 MG/DL — SIGNIFICANT CHANGE UP (ref 7–23)
CALCIUM SERPL-MCNC: 8.7 MG/DL — SIGNIFICANT CHANGE UP (ref 8.5–10.1)
CHLORIDE SERPL-SCNC: 106 MMOL/L — SIGNIFICANT CHANGE UP (ref 96–108)
CK MB CFR SERPL CALC: 1 NG/ML — SIGNIFICANT CHANGE UP (ref 0.5–3.6)
CO2 SERPL-SCNC: 32 MMOL/L — HIGH (ref 22–31)
CREAT SERPL-MCNC: 1.09 MG/DL — SIGNIFICANT CHANGE UP (ref 0.5–1.3)
FLUAV SPEC QL CULT: NEGATIVE — SIGNIFICANT CHANGE UP
FLUBV AG SPEC QL IA: NEGATIVE — SIGNIFICANT CHANGE UP
GLUCOSE SERPL-MCNC: 97 MG/DL — SIGNIFICANT CHANGE UP (ref 70–99)
HCT VFR BLD CALC: 41.3 % — SIGNIFICANT CHANGE UP (ref 39–50)
HGB BLD-MCNC: 13.4 G/DL — SIGNIFICANT CHANGE UP (ref 13–17)
INR BLD: 1.1 RATIO — SIGNIFICANT CHANGE UP (ref 0.88–1.16)
MCHC RBC-ENTMCNC: 26.5 PG — LOW (ref 27–34)
MCHC RBC-ENTMCNC: 32.4 GM/DL — SIGNIFICANT CHANGE UP (ref 32–36)
MCV RBC AUTO: 81.6 FL — SIGNIFICANT CHANGE UP (ref 80–100)
NRBC # BLD: 0 /100 WBCS — SIGNIFICANT CHANGE UP (ref 0–0)
PLATELET # BLD AUTO: 197 K/UL — SIGNIFICANT CHANGE UP (ref 150–400)
POTASSIUM SERPL-MCNC: 3.9 MMOL/L — SIGNIFICANT CHANGE UP (ref 3.5–5.3)
POTASSIUM SERPL-SCNC: 3.9 MMOL/L — SIGNIFICANT CHANGE UP (ref 3.5–5.3)
PROT SERPL-MCNC: 8 GM/DL — SIGNIFICANT CHANGE UP (ref 6–8.3)
PROTHROM AB SERPL-ACNC: 12.3 SEC — SIGNIFICANT CHANGE UP (ref 10–12.9)
RBC # BLD: 5.06 M/UL — SIGNIFICANT CHANGE UP (ref 4.2–5.8)
RBC # FLD: 15.2 % — HIGH (ref 10.3–14.5)
SODIUM SERPL-SCNC: 143 MMOL/L — SIGNIFICANT CHANGE UP (ref 135–145)
TROPONIN I SERPL-MCNC: <.015 NG/ML — SIGNIFICANT CHANGE UP (ref 0.01–0.04)
WBC # BLD: 7.55 K/UL — SIGNIFICANT CHANGE UP (ref 3.8–10.5)
WBC # FLD AUTO: 7.55 K/UL — SIGNIFICANT CHANGE UP (ref 3.8–10.5)

## 2019-01-08 PROCEDURE — 99284 EMERGENCY DEPT VISIT MOD MDM: CPT

## 2019-01-08 PROCEDURE — 71045 X-RAY EXAM CHEST 1 VIEW: CPT | Mod: 26

## 2019-01-08 NOTE — ED PROVIDER NOTE - PHYSICAL EXAMINATION
Vitals: HTN at 184/114, otherwise WNL  Gen: AAOx3, NAD, sitting comfortably in stretcher, calm, cooperative, non-toxic   Head: ncat, perrla, eomi b/l  Neck: supple, no lymphadenopathy, no midline deviation  Heart: rrr, no m/r/g  Lungs: CTA b/l, no rales/ronchi/wheezes  Abd: soft, nontender, non-distended, no rebound or guarding  Ext: no clubbing/cyanosis/edema  Neuro: sensation and muscle strength intact b/l, steady gait

## 2019-01-08 NOTE — ED ADULT NURSE NOTE - OBJECTIVE STATEMENT
pt a&O x3, pt c/o of sinus pressure, cold symptoms, cough, rhinorrhea with green mucus x1 week, chills. Pt also c/o of new onset abd pressure last BM yesterday and within normal limits. Pt denies N/V/D. Pain in abd with palpation 6/10.

## 2019-01-08 NOTE — ED PROVIDER NOTE - PROGRESS NOTE DETAILS
Results reported to patient--grossly benign, labs wnl, xr is clear   Pt. reports feeling better after meds  pt. agrees to f/u with primary care outpt.  pt. understands to return to ED if symptoms worsen; will d/c

## 2019-01-08 NOTE — ED PROVIDER NOTE - OBJECTIVE STATEMENT
49 yo M with generalized weakness, cold, congestion, fever, productive cough, chills for a few days.  Pt. feels generally under the weather.  Came to ER because PCP is on vacation.  + nausea and vomiting, when asked.  No other complaints or associated symptoms, no trauma, no inciting event.    ROS: negative for headache, chest pain, shortness of breath, abd pain, diarrhea, rash, paresthesia, and weakness--all other systems reviewed are negative.   PMH: negative; Meds: Denies; SH: Denies smoking/drinking/drug use

## 2019-01-08 NOTE — ED PROVIDER NOTE - MEDICAL DECISION MAKING DETAILS
47 yo M with viral syndrome, doubt pneumonia, doubt ACS  -basic labs, trop, ckmb, flu swab, cxr, ekg, iv line  -f/u results, reeval

## 2019-01-08 NOTE — ED ADULT NURSE NOTE - NSIMPLEMENTINTERV_GEN_ALL_ED
Implemented All Universal Safety Interventions:  [place] to call system. Call bell, personal items and telephone within reach. Instruct patient to call for assistance. Room bathroom lighting operational. Non-slip footwear when patient is off stretcher. Physically safe environment: no spills, clutter or unnecessary equipment. Stretcher in lowest position, wheels locked, appropriate side rails in place.

## 2019-01-09 PROBLEM — E11.9 TYPE 2 DIABETES MELLITUS WITHOUT COMPLICATIONS: Chronic | Status: ACTIVE | Noted: 2018-01-30

## 2019-01-09 PROBLEM — M19.90 UNSPECIFIED OSTEOARTHRITIS, UNSPECIFIED SITE: Chronic | Status: ACTIVE | Noted: 2017-12-18

## 2020-01-30 ENCOUNTER — OUTPATIENT (OUTPATIENT)
Dept: OUTPATIENT SERVICES | Facility: HOSPITAL | Age: 50
LOS: 1 days | End: 2020-01-30
Payer: MEDICARE

## 2020-01-30 ENCOUNTER — APPOINTMENT (OUTPATIENT)
Dept: UROLOGY | Facility: CLINIC | Age: 50
End: 2020-01-30
Payer: MEDICARE

## 2020-01-30 VITALS
BODY MASS INDEX: 33.42 KG/M2 | RESPIRATION RATE: 16 BRPM | HEIGHT: 77 IN | DIASTOLIC BLOOD PRESSURE: 109 MMHG | SYSTOLIC BLOOD PRESSURE: 164 MMHG | WEIGHT: 283 LBS | HEART RATE: 58 BPM

## 2020-01-30 PROCEDURE — 76870 US EXAM SCROTUM: CPT | Mod: 26

## 2020-01-30 PROCEDURE — 93975 VASCULAR STUDY: CPT | Mod: 26

## 2020-01-30 PROCEDURE — 76870 US EXAM SCROTUM: CPT

## 2020-01-30 PROCEDURE — 93975 VASCULAR STUDY: CPT

## 2020-01-30 PROCEDURE — 99214 OFFICE O/P EST MOD 30 MIN: CPT | Mod: 25

## 2020-01-30 NOTE — ASSESSMENT
[FreeTextEntry1] : The patient returns for followup after a 2 year hiatus. He recently had a sinus infection and is presently on amoxicillin. He states he does not have burning with urination but has discomfort at the tip of his phallus and occasionally in his scrotum. His exam is benign. I will obtain a urinalysis, blood studies and a scrotal ultrasound and see him back in 2 weeks in followup.\par \par \par

## 2020-01-30 NOTE — HISTORY OF PRESENT ILLNESS
[FreeTextEntry1] : The patient returns for followup after a 2 year hiatus. He recently had a sinus infection and is presently on amoxicillin. He states he does not have burning with urination but has discomfort at the tip of his phallus and occasionally in his scrotum. \par \par PMH: Patient initially presented with the chief complaint of pain in the testes and occasionally in the penis for several weeks for evaluation. However his of 6-7/10 in intensity. He describes it as a sharp pain.  I reviewed the questionnaire he had completed with him in detail.  He has no known drug allergies.  His past medical history demonstrates significant for diabetes.  In his present occupation  he has no known toxin exposure.  He does smoke and drinks only socially.  He has no known drug allergies.  His review of systems is non-contributory. His family history is not significant.\par

## 2020-01-30 NOTE — PHYSICAL EXAM
[General Appearance - Well Nourished] : well nourished [General Appearance - Well Developed] : well developed [Normal Appearance] : normal appearance [Well Groomed] : well groomed [General Appearance - In No Acute Distress] : no acute distress [Abdomen Soft] : soft [Urethral Meatus] : meatus normal [Urinary Bladder Findings] : the bladder was normal on palpation [Scrotum] : the scrotum was normal [Testes Mass (___cm)] : there were no testicular masses [Skin Turgor] : supple [Oriented To Time, Place, And Person] : oriented to person, place, and time [Affect] : the affect was normal [Mood] : the mood was normal [Not Anxious] : not anxious [Normal Station and Gait] : the gait and station were normal for the patient's age

## 2020-02-03 LAB
25(OH)D3 SERPL-MCNC: 22.3 NG/ML
ALBUMIN SERPL ELPH-MCNC: 4.3 G/DL
ALP BLD-CCNC: 69 U/L
ALT SERPL-CCNC: 26 U/L
ANION GAP SERPL CALC-SCNC: 12 MMOL/L
APPEARANCE: CLEAR
AST SERPL-CCNC: 15 U/L
BASOPHILS # BLD AUTO: 0.06 K/UL
BASOPHILS NFR BLD AUTO: 1 %
BILIRUB SERPL-MCNC: 0.4 MG/DL
BILIRUBIN URINE: NEGATIVE
BLOOD URINE: NEGATIVE
BUN SERPL-MCNC: 15 MG/DL
CALCIUM SERPL-MCNC: 9.2 MG/DL
CHLORIDE SERPL-SCNC: 102 MMOL/L
CHOLEST SERPL-MCNC: 178 MG/DL
CHOLEST/HDLC SERPL: 1.9 RATIO
CO2 SERPL-SCNC: 28 MMOL/L
COLOR: NORMAL
CREAT SERPL-MCNC: 1.01 MG/DL
EOSINOPHIL # BLD AUTO: 0.15 K/UL
EOSINOPHIL NFR BLD AUTO: 2.5 %
ESTIMATED AVERAGE GLUCOSE: 131 MG/DL
ESTRADIOL SERPL-MCNC: 18 PG/ML
GLUCOSE QUALITATIVE U: NEGATIVE
GLUCOSE SERPL-MCNC: 90 MG/DL
HBA1C MFR BLD HPLC: 6.2 %
HCT VFR BLD CALC: 42.5 %
HDLC SERPL-MCNC: 96 MG/DL
HGB BLD-MCNC: 13.3 G/DL
IMM GRANULOCYTES NFR BLD AUTO: 0.5 %
KETONES URINE: NEGATIVE
LDLC SERPL CALC-MCNC: 69 MG/DL
LEUKOCYTE ESTERASE URINE: NEGATIVE
LH SERPL-ACNC: 4 IU/L
LYMPHOCYTES # BLD AUTO: 2.5 K/UL
LYMPHOCYTES NFR BLD AUTO: 41.7 %
MAN DIFF?: NORMAL
MCHC RBC-ENTMCNC: 26.4 PG
MCHC RBC-ENTMCNC: 31.3 GM/DL
MCV RBC AUTO: 84.5 FL
MONOCYTES # BLD AUTO: 0.58 K/UL
MONOCYTES NFR BLD AUTO: 9.7 %
NEUTROPHILS # BLD AUTO: 2.67 K/UL
NEUTROPHILS NFR BLD AUTO: 44.6 %
NITRITE URINE: NEGATIVE
PH URINE: 6
PLATELET # BLD AUTO: 180 K/UL
POTASSIUM SERPL-SCNC: 4.1 MMOL/L
PROT SERPL-MCNC: 7.1 G/DL
PROTEIN URINE: NORMAL
PSA SERPL-MCNC: 0.79 NG/ML
RBC # BLD: 5.03 M/UL
RBC # FLD: 15.2 %
SODIUM SERPL-SCNC: 142 MMOL/L
SPECIFIC GRAVITY URINE: 1.02
TRIGL SERPL-MCNC: 69 MG/DL
TSH SERPL-ACNC: 2.83 UIU/ML
UROBILINOGEN URINE: NORMAL
WBC # FLD AUTO: 5.99 K/UL

## 2020-02-09 LAB
TESTOST BND SERPL-MCNC: 5.1 PG/ML
TESTOST SERPL-MCNC: 211.1 NG/DL

## 2020-02-10 DIAGNOSIS — N50.819 TESTICULAR PAIN, UNSPECIFIED: ICD-10-CM

## 2020-02-20 ENCOUNTER — APPOINTMENT (OUTPATIENT)
Dept: UROLOGY | Facility: CLINIC | Age: 50
End: 2020-02-20
Payer: MEDICARE

## 2020-02-20 VITALS
HEIGHT: 77 IN | WEIGHT: 274.4 LBS | TEMPERATURE: 98.5 F | BODY MASS INDEX: 32.4 KG/M2 | RESPIRATION RATE: 16 BRPM | DIASTOLIC BLOOD PRESSURE: 72 MMHG | HEART RATE: 68 BPM | SYSTOLIC BLOOD PRESSURE: 112 MMHG

## 2020-02-20 PROCEDURE — 99214 OFFICE O/P EST MOD 30 MIN: CPT

## 2020-02-20 NOTE — ASSESSMENT
[FreeTextEntry1] : The patient returns for followup. He recently had a sinus infection and is presently on amoxicillin. He states he does have burning at the tip of his phallus with urination but has discomfort which goes away with urination and occasionally in his scrotum. Testosterone was low. He has diabetes (metformin) and hypertension, He has been taking gabepentin intermittently.\par \par I suggested repeat testosterone blood test and to try to stay on gabepentin for a month and return for follow up.\par \par

## 2020-02-20 NOTE — HISTORY OF PRESENT ILLNESS
[FreeTextEntry1] : The patient returns for followup. He recently had a sinus infection and is presently on amoxicillin. He states he does have burning at the tip of his phallus with urination but has discomfort which goes away with urination and occasionally in his scrotum. \par \par PMH: Patient initially presented with the chief complaint of pain in the testes and occasionally in the penis for several weeks for evaluation. However his of 6-7/10 in intensity. He describes it as a sharp pain.  I reviewed the questionnaire he had completed with him in detail.  He has no known drug allergies.  His past medical history demonstrates significant for diabetes.  In his present occupation  he has no known toxin exposure.  He does smoke and drinks only socially.  He has no known drug allergies.  His review of systems is non-contributory. His family history is not significant.\par

## 2020-02-21 LAB
25(OH)D3 SERPL-MCNC: 24.5 NG/ML
ESTRADIOL SERPL-MCNC: 20 PG/ML
FSH SERPL-MCNC: 4.2 IU/L
LH SERPL-ACNC: 3.2 IU/L
PROLACTIN SERPL-MCNC: 15.4 NG/ML

## 2020-02-26 LAB
TESTOST BND SERPL-MCNC: 4.5 PG/ML
TESTOST SERPL-MCNC: 198.1 NG/DL

## 2020-03-19 ENCOUNTER — APPOINTMENT (OUTPATIENT)
Dept: UROLOGY | Facility: CLINIC | Age: 50
End: 2020-03-19

## 2020-06-04 ENCOUNTER — APPOINTMENT (OUTPATIENT)
Dept: UROLOGY | Facility: CLINIC | Age: 50
End: 2020-06-04
Payer: MEDICARE

## 2020-06-04 PROCEDURE — 99214 OFFICE O/P EST MOD 30 MIN: CPT

## 2020-06-04 RX ORDER — PAPAVERINE HYDROCHLORIDE 30 MG/ML
30 INJECTION, SOLUTION INTRAVENOUS
Qty: 2 | Refills: 0 | Status: ACTIVE | COMMUNITY
Start: 2020-06-04 | End: 1900-01-01

## 2020-06-04 NOTE — HISTORY OF PRESENT ILLNESS
[FreeTextEntry1] : The patient returns ith his wife with the 2 complaints of erectile dysfunction and intermittent testicular discomfort. He is under care for his diabetes and hypertension by his medical doctor. He states he is compliant with his medications.\par \par PMH: Patient initially presented with the chief complaint of pain in the testes and occasionally in the penis for several weeks for evaluation. However his of 6-7/10 in intensity. He describes it as a sharp pain.  I reviewed the questionnaire he had completed with him in detail.  He has no known drug allergies.  His past medical history demonstrates significant for diabetes.  In his present occupation  he has no known toxin exposure.  He does smoke and drinks only socially.  He has no known drug allergies.  His review of systems is non-contributory. His family history is not significant.\par

## 2020-06-04 NOTE — PHYSICAL EXAM
[General Appearance - Well Developed] : well developed [General Appearance - Well Nourished] : well nourished [Normal Appearance] : normal appearance [Well Groomed] : well groomed [General Appearance - In No Acute Distress] : no acute distress [Abdomen Soft] : soft [Urethral Meatus] : meatus normal [Urinary Bladder Findings] : the bladder was normal on palpation [Scrotum] : the scrotum was normal [Testes Mass (___cm)] : there were no testicular masses [Skin Turgor] : supple [Oriented To Time, Place, And Person] : oriented to person, place, and time [Affect] : the affect was normal [Mood] : the mood was normal [Normal Station and Gait] : the gait and station were normal for the patient's age [Not Anxious] : not anxious

## 2020-06-04 NOTE — ASSESSMENT
[FreeTextEntry1] : The patient returns with his wife with the 2 complaints of erectile dysfunction and intermittent testicular discomfort. He is under care for his diabetes and hypertension by his medical doctor. He states he is compliant with his medications. He has taken gabapentin without significant relief he also had a slightly low testosterone and have recommended a repeat.\par \par I discussed with him options for his erectile dysfunction. Since he is also under cardiac care and is concerned about PDE 5 inhibitors I have recommended a penile duplex ultrasound study. We discussed what this study is and information we hope to obtain.\par \par Consultation: 30 minutes  10 minutes reviewing his history and performing a physical examination.  20 minutes reviewing his ultrasound, discussing treatment options, writing prescriptions and request for blood studies and writing his note..\par \par \par

## 2020-06-11 ENCOUNTER — OUTPATIENT (OUTPATIENT)
Dept: OUTPATIENT SERVICES | Facility: HOSPITAL | Age: 50
LOS: 1 days | End: 2020-06-11
Payer: MEDICARE

## 2020-06-11 ENCOUNTER — APPOINTMENT (OUTPATIENT)
Dept: UROLOGY | Facility: CLINIC | Age: 50
End: 2020-06-11
Payer: MEDICARE

## 2020-06-11 VITALS
WEIGHT: 278 LBS | SYSTOLIC BLOOD PRESSURE: 103 MMHG | RESPIRATION RATE: 16 BRPM | TEMPERATURE: 97 F | BODY MASS INDEX: 32.97 KG/M2 | DIASTOLIC BLOOD PRESSURE: 70 MMHG | HEART RATE: 68 BPM

## 2020-06-11 VITALS — TEMPERATURE: 97.2 F

## 2020-06-11 DIAGNOSIS — E34.9 ENDOCRINE DISORDER, UNSPECIFIED: ICD-10-CM

## 2020-06-11 PROCEDURE — 93980 PENILE VASCULAR STUDY: CPT

## 2020-06-11 PROCEDURE — 99214 OFFICE O/P EST MOD 30 MIN: CPT | Mod: 25

## 2020-06-11 PROCEDURE — 54235 NJX CORPORA CAVERNOSA RX AGT: CPT

## 2020-06-11 PROCEDURE — 93980 PENILE VASCULAR STUDY: CPT | Mod: 26

## 2020-06-11 NOTE — ASSESSMENT
[FreeTextEntry1] : The patient returns with his wife with the 2 complaints of erectile dysfunction and intermittent testicular discomfort. He has a recent blood test that he would like to review and discuss options for therapy. He is under care for his diabetes and hypertension by his medical doctor. He states he is compliant with his medications.\par \par Blood studies again showed a low testosterone. However he has labile cardiovascular disease and Avastin to speak with his medical doctor regarding therapeutic intervention for his low testosterone.\par \par Duplex ultrasound study demonstrated significant areteriogenic dysfunction. Medical evaluation is ongoing for him. We discussed options including the use of higher dose Trimix and hopefully he will return for a 0.6 cc ejection training. He also discussed the vacuum constriction device and a booklet was given to him as well as starting the discussion of penile implant surgery.\par \par Consultation: 30 minutes  10 minutes reviewing his history and performing a physical examination.  20 minutes reviewing his ultrasound, discussing treatment options, writing prescriptions  and writing his note..\par \par \par

## 2020-06-11 NOTE — HISTORY OF PRESENT ILLNESS
[FreeTextEntry1] : The patient returns with his wife with the 2 complaints of erectile dysfunction and intermittent testicular discomfort. He has a recent blood test that he would like to review and discuss options for therapy. He is under care for his diabetes and hypertension by his medical doctor. He states he is compliant with his medications.\par \par PMH: Patient initially presented with the chief complaint of pain in the testes and occasionally in the penis for several weeks for evaluation. However his of 6-7/10 in intensity. He describes it as a sharp pain.  I reviewed the questionnaire he had completed with him in detail.  He has no known drug allergies.  His past medical history demonstrates significant for diabetes.  In his present occupation  he has no known toxin exposure.  He does smoke and drinks only socially.  He has no known drug allergies.  His review of systems is non-contributory. His family history is not significant.\par

## 2020-06-11 NOTE — PHYSICAL EXAM
[General Appearance - Well Developed] : well developed [Normal Appearance] : normal appearance [General Appearance - Well Nourished] : well nourished [General Appearance - In No Acute Distress] : no acute distress [Well Groomed] : well groomed [Urethral Meatus] : meatus normal [Abdomen Soft] : soft [Scrotum] : the scrotum was normal [Urinary Bladder Findings] : the bladder was normal on palpation [Skin Turgor] : supple [Testes Mass (___cm)] : there were no testicular masses [Oriented To Time, Place, And Person] : oriented to person, place, and time [Affect] : the affect was normal [Normal Station and Gait] : the gait and station were normal for the patient's age [Not Anxious] : not anxious [Mood] : the mood was normal

## 2020-06-17 DIAGNOSIS — E34.9 ENDOCRINE DISORDER, UNSPECIFIED: ICD-10-CM

## 2020-06-17 DIAGNOSIS — N52.9 MALE ERECTILE DYSFUNCTION, UNSPECIFIED: ICD-10-CM

## 2020-06-17 DIAGNOSIS — N50.819 TESTICULAR PAIN, UNSPECIFIED: ICD-10-CM

## 2020-06-18 ENCOUNTER — APPOINTMENT (OUTPATIENT)
Dept: UROLOGY | Facility: CLINIC | Age: 50
End: 2020-06-18

## 2020-06-18 NOTE — PHYSICAL EXAM
[General Appearance - Well Developed] : well developed [Normal Appearance] : normal appearance [General Appearance - Well Nourished] : well nourished [Abdomen Soft] : soft [Well Groomed] : well groomed [General Appearance - In No Acute Distress] : no acute distress [Urethral Meatus] : meatus normal [Urinary Bladder Findings] : the bladder was normal on palpation [Scrotum] : the scrotum was normal [Skin Turgor] : supple [Oriented To Time, Place, And Person] : oriented to person, place, and time [Testes Mass (___cm)] : there were no testicular masses [Affect] : the affect was normal [Mood] : the mood was normal [Not Anxious] : not anxious [Normal Station and Gait] : the gait and station were normal for the patient's age

## 2021-01-15 NOTE — ED ADULT TRIAGE NOTE - NS ED TRIAGE HISTORIAN
[facility] has amended HH order for INR day after dc to [facility]. Aware planning weekend dc.    Update:  Spoke with wife on phone while in pt room for IM delivery and she would like a PT eval at home. P/s msg to Carolynn RUIZ for orders.   Patient

## 2022-06-24 NOTE — ED ADULT TRIAGE NOTE - CHIEF COMPLAINT QUOTE
Unable to assess pt complaining of pain and swelling to bilateral knees times 12 week. pt has history of htn.

## 2023-03-16 ENCOUNTER — APPOINTMENT (OUTPATIENT)
Dept: UROLOGY | Facility: CLINIC | Age: 53
End: 2023-03-16

## 2023-04-10 ENCOUNTER — APPOINTMENT (OUTPATIENT)
Dept: UROLOGY | Facility: CLINIC | Age: 53
End: 2023-04-10

## 2023-07-24 ENCOUNTER — APPOINTMENT (OUTPATIENT)
Dept: UROLOGY | Facility: CLINIC | Age: 53
End: 2023-07-24
Payer: MEDICARE

## 2023-07-24 DIAGNOSIS — N52.9 MALE ERECTILE DYSFUNCTION, UNSPECIFIED: ICD-10-CM

## 2023-07-24 DIAGNOSIS — N50.819 TESTICULAR PAIN, UNSPECIFIED: ICD-10-CM

## 2023-07-24 DIAGNOSIS — Z12.5 ENCOUNTER FOR SCREENING FOR MALIGNANT NEOPLASM OF PROSTATE: ICD-10-CM

## 2023-07-24 DIAGNOSIS — R39.9 UNSPECIFIED SYMPTOMS AND SIGNS INVOLVING THE GENITOURINARY SYSTEM: ICD-10-CM

## 2023-07-24 PROCEDURE — 99204 OFFICE O/P NEW MOD 45 MIN: CPT

## 2023-07-24 PROCEDURE — 51798 US URINE CAPACITY MEASURE: CPT

## 2023-07-24 RX ORDER — LOSARTAN POTASSIUM 100 MG/1
100 TABLET, FILM COATED ORAL
Refills: 0 | Status: ACTIVE | COMMUNITY

## 2023-07-24 RX ORDER — HYDRALAZINE HYDROCHLORIDE 50 MG/1
50 TABLET ORAL
Refills: 0 | Status: ACTIVE | COMMUNITY

## 2023-07-24 RX ORDER — HYDROCHLOROTHIAZIDE 25 MG/1
25 TABLET ORAL
Refills: 0 | Status: ACTIVE | COMMUNITY

## 2023-07-24 RX ORDER — GABAPENTIN 300 MG/1
300 CAPSULE ORAL 3 TIMES DAILY
Qty: 42 | Refills: 1 | Status: DISCONTINUED | COMMUNITY
Start: 2017-08-03 | End: 2023-07-24

## 2023-07-24 NOTE — PHYSICAL EXAM
[General Appearance - Well Developed] : well developed [General Appearance - Well Nourished] : well nourished [Heart Rate And Rhythm] : Heart rate and rhythm were normal [] : no respiratory distress [No Focal Deficits] : no focal deficits [Oriented To Time, Place, And Person] : oriented to person, place, and time [Not Anxious] : not anxious [FreeTextEntry1] : wobbling, arthritic gait [Skin Color & Pigmentation] : normal skin color and pigmentation

## 2023-07-24 NOTE — ASSESSMENT
[FreeTextEntry1] : 51 y/o M to get PSA blood work done today. Pt not interested in pursuing any meds for frequency/urgency or setting up appt with pelvic floor team for pelvic pain.

## 2023-07-24 NOTE — HISTORY OF PRESENT ILLNESS
[Urinary Urgency] : urinary urgency [Urinary Frequency] : urinary frequency [Nocturia] : nocturia [FreeTextEntry1] : HPI: Pt used to be a pt of Dr. Vega- presents today for general urological evaluation- reports having intermittent  right pelvic pain that can cause some testicular discomfort-  pt takes Oxycodone for arthritis and lack of hip cartilage issues (seen by pain specialist and orthopedic specialist)- states it helps with testicular discomfort. Pt notes increased frequency- reports being pre-diabetic and on Metformin- notes some incontinence for the last 2 weeks with nocturia. \par \par We discussed that his pelvic pain (?ortho related) may be a contributor to his urinary frequency. PVR 38 cc. We offered management today with Dr. Trimble and the pelvic pain team, but he did not appear interested at this time. We also discussed that urinary frequency could be managed with PFPT and/or medication but at this time, he did not appear to be interested in oral meds and felt it would be difficult to make future [place] based appts as he lives in [place].\par \par Notes having ED- tried Trimix once and didn't feel comfortable using again. Was using Sildenafil but became cost prohibitive and stopped- not interested in pursuing anything currently. \par \par Last PSA done 2020- 0.79ng/mL\par \par Anticoagulation: None\par All: Seasonal \par PMHx: HTN, ED\par PSHx: NC\par FHx: Neg for CaP \par SocHx: never smoker, social ETOH\par Clearance:None\par \par Imaging: None \par

## 2023-08-06 LAB — PSA SERPL-MCNC: 1.56 NG/ML

## 2024-01-25 ENCOUNTER — APPOINTMENT (OUTPATIENT)
Dept: UROLOGY | Facility: CLINIC | Age: 54
End: 2024-01-25

## 2024-04-09 ENCOUNTER — NON-APPOINTMENT (OUTPATIENT)
Age: 54
End: 2024-04-09

## 2024-05-07 ENCOUNTER — APPOINTMENT (OUTPATIENT)
Dept: UROLOGY | Facility: CLINIC | Age: 54
End: 2024-05-07
Payer: MEDICARE

## 2024-05-07 VITALS — HEART RATE: 76 BPM | DIASTOLIC BLOOD PRESSURE: 102 MMHG | SYSTOLIC BLOOD PRESSURE: 152 MMHG

## 2024-05-07 PROCEDURE — 99214 OFFICE O/P EST MOD 30 MIN: CPT

## 2024-05-07 PROCEDURE — G2211 COMPLEX E/M VISIT ADD ON: CPT

## 2024-05-07 RX ORDER — TADALAFIL 10 MG/1
10 TABLET, FILM COATED ORAL
Qty: 12 | Refills: 1 | Status: ACTIVE | COMMUNITY
Start: 2024-05-07 | End: 1900-01-01

## 2024-05-07 NOTE — HISTORY OF PRESENT ILLNESS
[FreeTextEntry1] : The patient returns for follow-up.  He was last seen in June 2020 with 2 complaints of erectile dysfunction and intermittent testicular discomfort. He is under care for his diabetes and hypertension by his medical doctor. He states he is compliant with his medications.  He was recently seen Dr. Monahan for general urologic evaluation.  PMH: Patient initially presented with the chief complaint of pain in the testes and occasionally in the penis for several weeks for evaluation. However his of 6-7/10 in intensity. He describes it as a sharp pain.  I reviewed the questionnaire he had completed with him in detail.  He has no known drug allergies.  His past medical history demonstrates significant for diabetes.  In his present occupation  he has no known toxin exposure.  He does smoke and drinks only socially.  He has no known drug allergies.  His review of systems is non-contributory. His family history is not significant.

## 2024-05-07 NOTE — ASSESSMENT
[FreeTextEntry1] : The patient returns for follow-up.  He was last seen in June 2020 with 2 complaints of erectile dysfunction and intermittent testicular discomfort. He is under care for his diabetes and hypertension by his medical doctor. He states he is compliant with his medications.  He was recently seen Dr. Monahan for general urologic evaluation. He has taken gabapentin without significant relief he also had a slightly low testosterone and have recommended a repeat.  I discussed with him options for his erectile dysfunction. he would like to try a PDE 5 inhibitors He had not used Gabapentin for his groin pain. His  exam was normal. I have suggested neurologic/orthopedic evaluation.  Consultation: 35 minutes reviewing his history and discussing prior results, discussing various treatment options, writing medication prescription and writing his note. There was also additional time in preparing for the visit.

## 2024-06-04 ENCOUNTER — APPOINTMENT (OUTPATIENT)
Dept: UROLOGY | Facility: CLINIC | Age: 54
End: 2024-06-04
Payer: MEDICARE

## 2024-06-04 PROCEDURE — G2211 COMPLEX E/M VISIT ADD ON: CPT

## 2024-06-04 PROCEDURE — 99214 OFFICE O/P EST MOD 30 MIN: CPT

## 2024-06-04 NOTE — ASSESSMENT
[FreeTextEntry1] : The patient returns for follow-up.  At his recent visit in May 7, 2024 he wanted to try a PDE 5 inhibitor for his erectile dysfunction.  However he has not had occasion to use it as of yet.  He has been using gabapentin 100 mg 3 times a day and feels better when he is taking it.  He would like to continue that for now and have renewed his prescription.  I had suggested neurologic/orthopedic evaluation which she has not gone for as of yet.  I discussed the importance of this with him again.  I will see him back in follow-up in 1 to 2 months for his groin pain and erectile dysfunction.  Telehealth Consultation: 35 minutes reviewing his history and discussing prior results, discussing various treatment options, writing medication prescriptions, discussing options and writing his note. There was also additional time in preparing for the visit and assisting the patient with technology issues he was having with the telehealth platform.

## 2024-06-04 NOTE — HISTORY OF PRESENT ILLNESS
[FreeTextEntry1] : The patient-doctor. relationship has been established in a face-to-face fashion on real-time video audio HIPAA compliant communication using telemedicine software. The patient, Loki Deleon was at home and participated in the telephonic visit with the Physician Juanjo Vega MD PhD who was in his medical office. The patient's identity has been confirmed.  The patient was previously emailed a copy of the telemedicine consent.  The patient has had a chance to review and has now given verbal consent and has requested care to be assessed and treated through telemedicine. The patient understands there may be limitations in this process and that they need not need further follow-up care in the office and/or hospital settings.   Verbal consent was given on Tuesday, June 4, 2024 at 9 AM by the patient.  It was requested by the physician.  A written consent was previously sent for the patient to sign and return.  The patient returns for follow-up.  At his recent visit in May 7, 2024 he wanted to try a PDE 5 inhibitor for his erectile dysfunction.  I have prescribed tadalafil he returns today to discuss its efficacy.  PMH: Patient initially presented with the chief complaint of pain in the testes and occasionally in the penis for several weeks for evaluation. However his of 6-7/10 in intensity. He describes it as a sharp pain.  I reviewed the questionnaire he had completed with him in detail.  He has no known drug allergies.  His past medical history demonstrates significant for diabetes.  In his present occupation  he has no known toxin exposure.  He does smoke and drinks only socially.  He has no known drug allergies.  His review of systems is non-contributory. His family history is not significant.

## 2024-12-02 NOTE — ED ADULT NURSE NOTE - NURSING SKIN WOUND TYPE #1
Spouse called back and would like to be called on Brian' cell phone number instead [phone].   puncture